# Patient Record
Sex: FEMALE | Race: WHITE | NOT HISPANIC OR LATINO | Employment: FULL TIME | URBAN - METROPOLITAN AREA
[De-identification: names, ages, dates, MRNs, and addresses within clinical notes are randomized per-mention and may not be internally consistent; named-entity substitution may affect disease eponyms.]

---

## 2024-10-14 ENCOUNTER — DOCUMENTATION (OUTPATIENT)
Dept: TRANSPLANT | Facility: HOSPITAL | Age: 49
End: 2024-10-14

## 2024-10-14 NOTE — PROGRESS NOTES
"Referral reviewed. X2 Mohs surgeries.  11/2021 - [facility]. 1/2019 - [facility]   Message sent to [place].  \"Thank you for your interest in living kidney donation. We very much appreciate the time you took to fill out the initial screening.  At this time we are currently working through multiple applicants and will contact you when we are ready to proceed with the next step in testing.  Please feel free to reach out with any questions.\"  "